# Patient Record
Sex: FEMALE | URBAN - METROPOLITAN AREA
[De-identification: names, ages, dates, MRNs, and addresses within clinical notes are randomized per-mention and may not be internally consistent; named-entity substitution may affect disease eponyms.]

---

## 2017-07-26 ENCOUNTER — INPATIENT (INPATIENT)
Facility: HOSPITAL | Age: 82
LOS: 2 days | Discharge: HOME | DRG: 512 | End: 2017-07-29
Attending: ORTHOPAEDIC SURGERY | Admitting: ORTHOPAEDIC SURGERY
Payer: MEDICARE

## 2017-07-26 VITALS
HEART RATE: 67 BPM | OXYGEN SATURATION: 100 % | DIASTOLIC BLOOD PRESSURE: 63 MMHG | RESPIRATION RATE: 22 BRPM | SYSTOLIC BLOOD PRESSURE: 132 MMHG | TEMPERATURE: 98 F

## 2017-07-26 DIAGNOSIS — Z98.890 OTHER SPECIFIED POSTPROCEDURAL STATES: Chronic | ICD-10-CM

## 2017-07-26 LAB
BASOPHILS # BLD AUTO: 0.1 K/UL — SIGNIFICANT CHANGE UP (ref 0–0.2)
BASOPHILS NFR BLD AUTO: 0.8 % — SIGNIFICANT CHANGE UP (ref 0–2)
EOSINOPHIL # BLD AUTO: 0.2 K/UL — SIGNIFICANT CHANGE UP (ref 0–0.5)
EOSINOPHIL NFR BLD AUTO: 2.5 % — SIGNIFICANT CHANGE UP (ref 0–6)
GAS PNL BLDV: SIGNIFICANT CHANGE UP
HCT VFR BLD CALC: 35.6 % — SIGNIFICANT CHANGE UP (ref 34.5–45)
HGB BLD-MCNC: 11.8 G/DL — SIGNIFICANT CHANGE UP (ref 11.5–15.5)
LYMPHOCYTES # BLD AUTO: 2.2 K/UL — SIGNIFICANT CHANGE UP (ref 1–3.3)
LYMPHOCYTES # BLD AUTO: 22.9 % — SIGNIFICANT CHANGE UP (ref 13–44)
MCHC RBC-ENTMCNC: 32.9 PG — SIGNIFICANT CHANGE UP (ref 27–34)
MCHC RBC-ENTMCNC: 33.1 GM/DL — SIGNIFICANT CHANGE UP (ref 32–36)
MCV RBC AUTO: 99.4 FL — SIGNIFICANT CHANGE UP (ref 80–100)
MONOCYTES # BLD AUTO: 1 K/UL — HIGH (ref 0–0.9)
MONOCYTES NFR BLD AUTO: 9.8 % — SIGNIFICANT CHANGE UP (ref 2–14)
NEUTROPHILS # BLD AUTO: 6.2 K/UL — SIGNIFICANT CHANGE UP (ref 1.8–7.4)
NEUTROPHILS NFR BLD AUTO: 63.9 % — SIGNIFICANT CHANGE UP (ref 43–77)
PLATELET # BLD AUTO: 259 K/UL — SIGNIFICANT CHANGE UP (ref 150–400)
RBC # BLD: 3.58 M/UL — LOW (ref 3.8–5.2)
RBC # FLD: 12.8 % — SIGNIFICANT CHANGE UP (ref 10.3–14.5)
WBC # BLD: 9.7 K/UL — SIGNIFICANT CHANGE UP (ref 3.8–10.5)
WBC # FLD AUTO: 9.7 K/UL — SIGNIFICANT CHANGE UP (ref 3.8–10.5)

## 2017-07-26 PROCEDURE — 93010 ELECTROCARDIOGRAM REPORT: CPT

## 2017-07-26 PROCEDURE — 99285 EMERGENCY DEPT VISIT HI MDM: CPT | Mod: 25,GC

## 2017-07-26 RX ORDER — TETANUS TOXOID, REDUCED DIPHTHERIA TOXOID AND ACELLULAR PERTUSSIS VACCINE, ADSORBED 5; 2.5; 8; 8; 2.5 [IU]/.5ML; [IU]/.5ML; UG/.5ML; UG/.5ML; UG/.5ML
0.5 SUSPENSION INTRAMUSCULAR ONCE
Qty: 0 | Refills: 0 | Status: COMPLETED | OUTPATIENT
Start: 2017-07-26 | End: 2017-07-26

## 2017-07-26 RX ORDER — CEFAZOLIN SODIUM 1 G
1000 VIAL (EA) INJECTION ONCE
Qty: 0 | Refills: 0 | Status: COMPLETED | OUTPATIENT
Start: 2017-07-26 | End: 2017-07-26

## 2017-07-26 RX ORDER — MORPHINE SULFATE 50 MG/1
4 CAPSULE, EXTENDED RELEASE ORAL ONCE
Qty: 0 | Refills: 0 | Status: DISCONTINUED | OUTPATIENT
Start: 2017-07-26 | End: 2017-07-26

## 2017-07-26 RX ADMIN — MORPHINE SULFATE 4 MILLIGRAM(S): 50 CAPSULE, EXTENDED RELEASE ORAL at 23:58

## 2017-07-26 RX ADMIN — Medication 100 MILLIGRAM(S): at 23:59

## 2017-07-26 RX ADMIN — TETANUS TOXOID, REDUCED DIPHTHERIA TOXOID AND ACELLULAR PERTUSSIS VACCINE, ADSORBED 0.5 MILLILITER(S): 5; 2.5; 8; 8; 2.5 SUSPENSION INTRAMUSCULAR at 23:59

## 2017-07-26 NOTE — ED PROVIDER NOTE - PROGRESS NOTE DETAILS
pain improved after morphine, was nauseas after xray now resolved, trop negative, ortho consulted for open fx -Slowey DO

## 2017-07-26 NOTE — ED PROVIDER NOTE - CARE PLAN
Principal Discharge DX:	Type I or II open displaced comminuted fracture of shaft of right radius, initial encounter  Secondary Diagnosis:	Type I or II open displaced comminuted fracture of shaft of right ulna, initial encounter

## 2017-07-26 NOTE — ED PROVIDER NOTE - ATTENDING CONTRIBUTION TO CARE
I was physically present for the E/M service provided. I agree with above history, physical, and plan which I have reviewed and edited where appropriate. I was physically present for the key portions of the service provided.    82yo F with CAD on ASA, CVA, HLD FFS c/o right wrist pain, right knee, right hip pain, CP  CN II-XII intact, I was physically present for the E/M service provided. I agree with above history, physical, and plan which I have reviewed and edited where appropriate. I was physically present for the key portions of the service provided.    84yo F with CAD on ASA, CVA, HLD FFS c/o right wrist pain, right knee, right hip pain, CP  CN II-XII intact, right wrist deformity w/ radial pulse +2 and distal sensation intact, no mid-line c-spine TTP, abdomen soft NTND.  -Wrist fx reduced by orthopedics

## 2017-07-26 NOTE — ED ADULT NURSE NOTE - OBJECTIVE STATEMENT
83 y.o F arrived to ED accompanied by family. A&Ox3. pt Dutch speaking, family at bedside to translate. As per pt family, pt lost balance and fell backwards, fell onto R side with impact to R arm; unsure if hit head, unknown LOC, pt takes baby ASA daily. pt c/o pain in R arm, especially R wrist; also c/o pain in R knee/ R hip. c/o chest discomfort, denies SOB, denies nausea/vomiting. upon exam, minor bleeding noted from R forearm; +peripheral pulses, cap refill <2seconds. Respirations nonlabored, O2 sat 100% RA; abdomen soft; moves all extremities. Denies fevers, chills, dizziness, vision changes, bowel/bladder symptoms. EKG performed, family at bedside.

## 2017-07-26 NOTE — ED PROVIDER NOTE - MEDICAL DECISION MAKING DETAILS
will ct head/neck, xray humerus, forearm, hip/pelvis, knee, tdap. will reexamine arm after pain meds, Abx if open. CP concerning with h/o ACS- trop and EKG. likely admission

## 2017-07-26 NOTE — ED PROVIDER NOTE - NS ED ROS FT
ROS: + CP no SOB. no cough. no n/v/d/c. no abd pain. no rash. no bleeding. no urinary complaints. no weakness. no vision changes. no HA. no neck/back pain. +extremity swelling.

## 2017-07-26 NOTE — ED PROVIDER NOTE - OBJECTIVE STATEMENT
84yo F with CAD on ASA, CVA, HLD shady went to say hi and scared her, she lost balance and fell backward. pain to rt arm especially wrist. pain to rt knee and hip. unable to walk. unsure if hit head. no HA. complaining of CP tightness, no SOB. started after fall. very anxious. bleeding unclear where- somewhere on arm. tdap unknown     PCP-

## 2017-07-26 NOTE — ED PROVIDER NOTE - PHYSICAL EXAMINATION
Gen: very comfortable, rt arm in sling, AOx3  Head: NCAT  HEENT: PERRL, oral mucosa moist, normal conjunctiva, neck supple  Lung: CTAB, no respiratory distress  CV: rrr, no murmur, Normal perfusion  Abd: soft, NTND  MSK: visibile deformity rt prxoimal wrist- may be open unable to fully examine due to pain, +ttp rt prox humerus unable to range rt arm, no scaphoid pain, no chest wall or pelvis pain, +ttp rt patella with rt knee effusion, +pain rt groin with ROM.   Neuro: No focal neurologic deficits  Skin: No rash   Psych: normal affect

## 2017-07-27 ENCOUNTER — TRANSCRIPTION ENCOUNTER (OUTPATIENT)
Age: 82
End: 2017-07-27

## 2017-07-27 DIAGNOSIS — J45.909 UNSPECIFIED ASTHMA, UNCOMPLICATED: ICD-10-CM

## 2017-07-27 DIAGNOSIS — E78.5 HYPERLIPIDEMIA, UNSPECIFIED: ICD-10-CM

## 2017-07-27 DIAGNOSIS — R52 PAIN, UNSPECIFIED: ICD-10-CM

## 2017-07-27 DIAGNOSIS — S52.351B: ICD-10-CM

## 2017-07-27 LAB
ALBUMIN SERPL ELPH-MCNC: 4.2 G/DL — SIGNIFICANT CHANGE UP (ref 3.3–5)
ALP SERPL-CCNC: 69 U/L — SIGNIFICANT CHANGE UP (ref 40–120)
ALT FLD-CCNC: 12 U/L RC — SIGNIFICANT CHANGE UP (ref 10–45)
ANION GAP SERPL CALC-SCNC: 14 MMOL/L — SIGNIFICANT CHANGE UP (ref 5–17)
APTT BLD: 33.4 SEC — SIGNIFICANT CHANGE UP (ref 27.5–37.4)
AST SERPL-CCNC: 19 U/L — SIGNIFICANT CHANGE UP (ref 10–40)
BILIRUB SERPL-MCNC: 0.2 MG/DL — SIGNIFICANT CHANGE UP (ref 0.2–1.2)
BLD GP AB SCN SERPL QL: NEGATIVE — SIGNIFICANT CHANGE UP
BUN SERPL-MCNC: 22 MG/DL — SIGNIFICANT CHANGE UP (ref 7–23)
CALCIUM SERPL-MCNC: 10.1 MG/DL — SIGNIFICANT CHANGE UP (ref 8.4–10.5)
CHLORIDE SERPL-SCNC: 101 MMOL/L — SIGNIFICANT CHANGE UP (ref 96–108)
CK MB BLD-MCNC: 2.3 % — SIGNIFICANT CHANGE UP (ref 0–3.5)
CK MB CFR SERPL CALC: 1.3 NG/ML — SIGNIFICANT CHANGE UP (ref 0–3.8)
CK SERPL-CCNC: 57 U/L — SIGNIFICANT CHANGE UP (ref 25–170)
CO2 SERPL-SCNC: 26 MMOL/L — SIGNIFICANT CHANGE UP (ref 22–31)
CREAT SERPL-MCNC: 1.13 MG/DL — SIGNIFICANT CHANGE UP (ref 0.5–1.3)
GLUCOSE SERPL-MCNC: 156 MG/DL — HIGH (ref 70–99)
INR BLD: 1.02 RATIO — SIGNIFICANT CHANGE UP (ref 0.88–1.16)
LIDOCAIN IGE QN: 34 U/L — SIGNIFICANT CHANGE UP (ref 7–60)
POTASSIUM SERPL-MCNC: 4.8 MMOL/L — SIGNIFICANT CHANGE UP (ref 3.5–5.3)
POTASSIUM SERPL-SCNC: 4.8 MMOL/L — SIGNIFICANT CHANGE UP (ref 3.5–5.3)
PROT SERPL-MCNC: 8.1 G/DL — SIGNIFICANT CHANGE UP (ref 6–8.3)
PROTHROM AB SERPL-ACNC: 11.1 SEC — SIGNIFICANT CHANGE UP (ref 9.8–12.7)
RH IG SCN BLD-IMP: POSITIVE — SIGNIFICANT CHANGE UP
RH IG SCN BLD-IMP: POSITIVE — SIGNIFICANT CHANGE UP
SODIUM SERPL-SCNC: 141 MMOL/L — SIGNIFICANT CHANGE UP (ref 135–145)
TROPONIN T SERPL-MCNC: <0.01 NG/ML — SIGNIFICANT CHANGE UP (ref 0–0.06)

## 2017-07-27 PROCEDURE — 73090 X-RAY EXAM OF FOREARM: CPT | Mod: 26,LT

## 2017-07-27 PROCEDURE — 73502 X-RAY EXAM HIP UNI 2-3 VIEWS: CPT | Mod: 26,RT

## 2017-07-27 PROCEDURE — 71010: CPT | Mod: 26

## 2017-07-27 PROCEDURE — 73030 X-RAY EXAM OF SHOULDER: CPT | Mod: 26,RT

## 2017-07-27 PROCEDURE — 73562 X-RAY EXAM OF KNEE 3: CPT | Mod: 26,RT

## 2017-07-27 PROCEDURE — 73060 X-RAY EXAM OF HUMERUS: CPT | Mod: 26,RT

## 2017-07-27 PROCEDURE — 72125 CT NECK SPINE W/O DYE: CPT | Mod: 26

## 2017-07-27 PROCEDURE — 73090 X-RAY EXAM OF FOREARM: CPT | Mod: 26,77,LT

## 2017-07-27 PROCEDURE — 70450 CT HEAD/BRAIN W/O DYE: CPT | Mod: 26

## 2017-07-27 RX ORDER — MAGNESIUM HYDROXIDE 400 MG/1
30 TABLET, CHEWABLE ORAL DAILY
Qty: 0 | Refills: 0 | Status: DISCONTINUED | OUTPATIENT
Start: 2017-07-27 | End: 2017-07-29

## 2017-07-27 RX ORDER — OXYCODONE HYDROCHLORIDE 5 MG/1
10 TABLET ORAL EVERY 4 HOURS
Qty: 0 | Refills: 0 | Status: DISCONTINUED | OUTPATIENT
Start: 2017-07-27 | End: 2017-07-27

## 2017-07-27 RX ORDER — DOCUSATE SODIUM 100 MG
100 CAPSULE ORAL THREE TIMES A DAY
Qty: 0 | Refills: 0 | Status: DISCONTINUED | OUTPATIENT
Start: 2017-07-27 | End: 2017-07-29

## 2017-07-27 RX ORDER — HEPARIN SODIUM 5000 [USP'U]/ML
5000 INJECTION INTRAVENOUS; SUBCUTANEOUS EVERY 8 HOURS
Qty: 0 | Refills: 0 | Status: DISCONTINUED | OUTPATIENT
Start: 2017-07-27 | End: 2017-07-29

## 2017-07-27 RX ORDER — OXYCODONE HYDROCHLORIDE 5 MG/1
5 TABLET ORAL EVERY 4 HOURS
Qty: 0 | Refills: 0 | Status: DISCONTINUED | OUTPATIENT
Start: 2017-07-27 | End: 2017-07-27

## 2017-07-27 RX ORDER — ASPIRIN/CALCIUM CARB/MAGNESIUM 324 MG
81 TABLET ORAL DAILY
Qty: 0 | Refills: 0 | Status: DISCONTINUED | OUTPATIENT
Start: 2017-07-27 | End: 2017-07-27

## 2017-07-27 RX ORDER — ONDANSETRON 8 MG/1
4 TABLET, FILM COATED ORAL EVERY 6 HOURS
Qty: 0 | Refills: 0 | Status: DISCONTINUED | OUTPATIENT
Start: 2017-07-27 | End: 2017-07-29

## 2017-07-27 RX ORDER — LEVOTHYROXINE SODIUM 125 MCG
1 TABLET ORAL
Qty: 0 | Refills: 0 | COMMUNITY

## 2017-07-27 RX ORDER — OXYCODONE HYDROCHLORIDE 5 MG/1
10 TABLET ORAL EVERY 4 HOURS
Qty: 0 | Refills: 0 | Status: DISCONTINUED | OUTPATIENT
Start: 2017-07-27 | End: 2017-07-29

## 2017-07-27 RX ORDER — ONDANSETRON 8 MG/1
4 TABLET, FILM COATED ORAL ONCE
Qty: 0 | Refills: 0 | Status: DISCONTINUED | OUTPATIENT
Start: 2017-07-27 | End: 2017-07-27

## 2017-07-27 RX ORDER — ACETAMINOPHEN 500 MG
650 TABLET ORAL EVERY 6 HOURS
Qty: 0 | Refills: 0 | Status: DISCONTINUED | OUTPATIENT
Start: 2017-07-27 | End: 2017-07-29

## 2017-07-27 RX ORDER — ALBUTEROL 90 UG/1
2 AEROSOL, METERED ORAL
Qty: 0 | Refills: 0 | COMMUNITY

## 2017-07-27 RX ORDER — CEFAZOLIN SODIUM 1 G
2000 VIAL (EA) INJECTION EVERY 8 HOURS
Qty: 0 | Refills: 0 | Status: COMPLETED | OUTPATIENT
Start: 2017-07-27 | End: 2017-07-28

## 2017-07-27 RX ORDER — HYDROMORPHONE HYDROCHLORIDE 2 MG/ML
0.5 INJECTION INTRAMUSCULAR; INTRAVENOUS; SUBCUTANEOUS ONCE
Qty: 0 | Refills: 0 | Status: DISCONTINUED | OUTPATIENT
Start: 2017-07-27 | End: 2017-07-27

## 2017-07-27 RX ORDER — OXYCODONE HYDROCHLORIDE 5 MG/1
5 TABLET ORAL EVERY 4 HOURS
Qty: 0 | Refills: 0 | Status: DISCONTINUED | OUTPATIENT
Start: 2017-07-27 | End: 2017-07-29

## 2017-07-27 RX ORDER — ALBUTEROL 90 UG/1
2 AEROSOL, METERED ORAL EVERY 6 HOURS
Qty: 0 | Refills: 0 | Status: DISCONTINUED | OUTPATIENT
Start: 2017-07-27 | End: 2017-07-27

## 2017-07-27 RX ORDER — ACETAMINOPHEN 500 MG
1000 TABLET ORAL ONCE
Qty: 0 | Refills: 0 | Status: COMPLETED | OUTPATIENT
Start: 2017-07-27 | End: 2017-07-27

## 2017-07-27 RX ORDER — LEVOTHYROXINE SODIUM 125 MCG
150 TABLET ORAL DAILY
Qty: 0 | Refills: 0 | Status: DISCONTINUED | OUTPATIENT
Start: 2017-07-27 | End: 2017-07-27

## 2017-07-27 RX ORDER — METOCLOPRAMIDE HCL 10 MG
10 TABLET ORAL EVERY 6 HOURS
Qty: 0 | Refills: 0 | Status: DISCONTINUED | OUTPATIENT
Start: 2017-07-27 | End: 2017-07-27

## 2017-07-27 RX ORDER — SODIUM CHLORIDE 9 MG/ML
1000 INJECTION, SOLUTION INTRAVENOUS
Qty: 0 | Refills: 0 | Status: DISCONTINUED | OUTPATIENT
Start: 2017-07-27 | End: 2017-07-29

## 2017-07-27 RX ORDER — HYDROMORPHONE HYDROCHLORIDE 2 MG/ML
0.5 INJECTION INTRAMUSCULAR; INTRAVENOUS; SUBCUTANEOUS
Qty: 0 | Refills: 0 | Status: DISCONTINUED | OUTPATIENT
Start: 2017-07-27 | End: 2017-07-27

## 2017-07-27 RX ORDER — ASPIRIN/CALCIUM CARB/MAGNESIUM 324 MG
1 TABLET ORAL
Qty: 0 | Refills: 0 | COMMUNITY

## 2017-07-27 RX ORDER — LEVOTHYROXINE SODIUM 125 MCG
150 TABLET ORAL DAILY
Qty: 0 | Refills: 0 | Status: DISCONTINUED | OUTPATIENT
Start: 2017-07-27 | End: 2017-07-29

## 2017-07-27 RX ORDER — ASPIRIN/CALCIUM CARB/MAGNESIUM 324 MG
81 TABLET ORAL DAILY
Qty: 0 | Refills: 0 | Status: DISCONTINUED | OUTPATIENT
Start: 2017-07-27 | End: 2017-07-29

## 2017-07-27 RX ORDER — SODIUM CHLORIDE 9 MG/ML
1000 INJECTION, SOLUTION INTRAVENOUS
Qty: 0 | Refills: 0 | Status: DISCONTINUED | OUTPATIENT
Start: 2017-07-27 | End: 2017-07-27

## 2017-07-27 RX ORDER — MORPHINE SULFATE 50 MG/1
2 CAPSULE, EXTENDED RELEASE ORAL EVERY 4 HOURS
Qty: 0 | Refills: 0 | Status: DISCONTINUED | OUTPATIENT
Start: 2017-07-27 | End: 2017-07-29

## 2017-07-27 RX ADMIN — SODIUM CHLORIDE 60 MILLILITER(S): 9 INJECTION, SOLUTION INTRAVENOUS at 15:30

## 2017-07-27 RX ADMIN — Medication 100 MILLIGRAM(S): at 21:48

## 2017-07-27 RX ADMIN — Medication 10 MILLIGRAM(S): at 05:20

## 2017-07-27 RX ADMIN — SODIUM CHLORIDE 75 MILLILITER(S): 9 INJECTION, SOLUTION INTRAVENOUS at 05:20

## 2017-07-27 RX ADMIN — MORPHINE SULFATE 4 MILLIGRAM(S): 50 CAPSULE, EXTENDED RELEASE ORAL at 02:33

## 2017-07-27 RX ADMIN — Medication 1000 MILLIGRAM(S): at 16:00

## 2017-07-27 RX ADMIN — Medication 150 MICROGRAM(S): at 07:32

## 2017-07-27 RX ADMIN — HYDROMORPHONE HYDROCHLORIDE 0.5 MILLIGRAM(S): 2 INJECTION INTRAMUSCULAR; INTRAVENOUS; SUBCUTANEOUS at 02:34

## 2017-07-27 RX ADMIN — Medication 400 MILLIGRAM(S): at 15:30

## 2017-07-27 NOTE — CONSULT NOTE ADULT - SUBJECTIVE AND OBJECTIVE BOX
84yo F with CAD on ASA, CVA, HLD shady went to say hi and scared her, she lost balance and fell backward. pain to rt arm especially wrist. pain to rt knee and hip. unable to walk. unsure if hit head. no HA. complaining of CP tightness, no SOB. started after fall. very anxious. bleeding unclear where- somewhere on arm. tdap unknown     PAST MEDICAL & SURGICAL HISTORY:  Osteoarthritis  CVA (Cerebral Infarction)  Hyperlipidemia  Asthma  H/O hand surgery  H/O left knee surgery        MEDICATIONS  (STANDING):  lactated ringers. 1000 milliLiter(s) (75 mL/Hr) IV Continuous <Continuous>  aspirin enteric coated 81 milliGRAM(s) Oral daily  levothyroxine 150 MICROGram(s) Oral daily    MEDICATIONS  (PRN):  oxyCODONE    IR 10 milliGRAM(s) Oral every 4 hours PRN Moderate Pain  oxyCODONE    IR 5 milliGRAM(s) Oral every 4 hours PRN Mild Pain  metoclopramide Injectable 10 milliGRAM(s) IV Push every 6 hours PRN Nausea and/or Vomiting  ALBUTerol    90 MICROgram(s) HFA Inhaler 2 Puff(s) Inhalation every 6 hours PRN Shortness of Breath and/or Wheezing    Social Hx:  Tobacco: negative  ETOH: Negative  Drugs: Negative    Family Hx:  Non contributory      ROS:  CONSTITUTIONAL: No weakness, fevers or chills  EYES/ENT: No visual changes;  No vertigo or throat pain   NECK: No pain or stiffness  RESPIRATORY: No cough, wheezing, hemoptysis; No shortness of breath  CARDIOVASCULAR: No chest pain or palpitations  GASTROINTESTINAL: No abdominal or epigastric pain. No nausea, vomiting, or hematemesis; No diarrhea or constipation. No melena or hematochezia.  GENITOURINARY: No dysuria, frequency or hematuria  NEUROLOGICAL: No numbness or weakness  SKIN: No itching, burning, rashes, or lesions   MUSCULOSKELETAL: Right arm pain    INTERVAL HPI/OVERNIGHT EVENTS:  T(C): 36.6 (07-27-17 @ 04:54), Max: 36.8 (07-27-17 @ 02:33)  HR: 83 (07-27-17 @ 04:54) (67 - 85)  BP: 148/71 (07-27-17 @ 04:54) (132/63 - 149/61)  RR: 16 (07-27-17 @ 04:54) (16 - 22)  SpO2: 95% (07-27-17 @ 04:54) (95% - 100%)  Wt(kg): --  I&O's Summary      PHYSICAL EXAM:  GENERAL: NAD, well-groomed, well-developed  HEAD:  Atraumatic, Normocephalic  EYES: EOMI, PERRLA, conjunctiva and sclera clear  ENMT: No tonsillar erythema, exudates, or enlargement; Moist mucous membranes, Good dentition, No lesions  NECK: Supple, No JVD, Normal thyroid  NERVOUS SYSTEM:  Alert & Oriented X3, Good concentration; Motor Strength 5/5 B/L upper and lower extremities; DTRs 2+ intact and symmetric  CHEST/LUNG: Clear to percussion bilaterally; No rales, rhonchi, wheezing, or rubs  HEART: Regular rate and rhythm;2/6 RICHARD  ABDOMEN: Soft, Nontender, Nondistended; Bowel sounds present  EXTREMITIES:  2+ Peripheral Pulses, No clubbing, cyanosis, or edema right arm in a cast  LYMPH: No lymphadenopathy noted  SKIN: No rashes or lesions        LABS:                        11.8   9.7   )-----------( 259      ( 26 Jul 2017 23:48 )             35.6     07-26    141  |  101  |  22  ----------------------------<  156<H>  4.8   |  26  |  1.13    Ca    10.1      26 Jul 2017 23:48    TPro  8.1  /  Alb  4.2  /  TBili  0.2  /  DBili  x   /  AST  19  /  ALT  12  /  AlkPhos  69  07-26    PT/INR - ( 26 Jul 2017 23:48 )   PT: 11.1 sec;   INR: 1.02 ratio         PTT - ( 26 Jul 2017 23:48 )  PTT:33.4 sec    CAPILLARY BLOOD GLUCOSE    EKG:    Ventricular Rate 71 BPM    Atrial Rate 71 BPM    P-R Interval 144 ms    QRS Duration 70 ms     ms    QTc 417 ms    P Axis 74 degrees    R Axis -17 degrees    T Axis 52 degrees    Diagnosis Line NORMAL SINUS RHYTHM  NORMAL ECG              Radiology reports:

## 2017-07-27 NOTE — H&P ADULT - ASSESSMENT
83F with right grade 1 open forearm both bone fracture    -antibiotics and tetanus given in ER  -NPO except meds  -IV fluids  -No anticoagulation  -OR today for I&D and ORIF  -Medical clearance pending  -NWB RUE in splint

## 2017-07-27 NOTE — H&P ADULT - NSHPPHYSICALEXAM_GEN_ALL_CORE
General: Not in acute distress    Right Upper Extremity:   Small poke hole on the radial aspect of distal forearm  Anterior interosseous nerve, Posterior interosseous nerve, Median, Ulnar, Radial, Musculocutaneous, and Axillary nerves intact  Sensation intact to light touch in C5-T1 nerve distribution  Radial pulse 2+  Compartments soft and compressible    Secondary survey: no tenderness to palpation of the left upper extremity, or bilateral lower extremities. No TTP of axial spine. Sensation/pulses intact.

## 2017-07-27 NOTE — H&P ADULT - HISTORY OF PRESENT ILLNESS
83 year old female presents with right grade 1 open forearm both bone fracture after a mechanical fall. Denies head strike or loss of consciousness. Denies any other orthopedic injuries at this time. Denies any paresthesia in the right upper extremity. She is right hand dominant.

## 2017-07-27 NOTE — PROGRESS NOTE ADULT - ASSESSMENT
84 y/o fm with right BB forearm open fracture for OR today, NPO  Zarina Arenas PA-C  Orthopaedic Surgery  Team pager [phone]  [facility] [phone]  korzug-[phone]

## 2017-07-27 NOTE — PROGRESS NOTE ADULT - SUBJECTIVE AND OBJECTIVE BOX
Patient is a 83y old  Female who presents with a chief complaint of right grade 1 open forearm both bone fracture (27 Jul 2017 03:29)  Patient for OR today  Patient resting without complaints.  No chest pain, SOB, N/V.    T(C): 36.6 (07-27-17 @ 04:54), Max: 36.8 (07-27-17 @ 02:33)  HR: 83 (07-27-17 @ 04:54) (67 - 85)  BP: 148/71 (07-27-17 @ 04:54) (132/63 - 149/61)  RR: 16 (07-27-17 @ 04:54) (16 - 22)  SpO2: 95% (07-27-17 @ 04:54) (95% - 100%)      Exam:  Alert and Oriented, No Acute Distress  Cards: +S1/S2, RRR  Pulm: CTAB  Lower Extremities:  right U/E, moving digits  SILT  +DP Pulse    Xray:                           11.8   9.7   )-----------( 259      ( 26 Jul 2017 23:48 )             35.6    07-26    141  |  101  |  22  ----------------------------<  156<H>  4.8   |  26  |  1.13    Ca    10.1      26 Jul 2017 23:48    TPro  8.1  /  Alb  4.2  /  TBili  0.2  /  DBili  x   /  AST  19  /  ALT  12  /  AlkPhos  69  07-26

## 2017-07-27 NOTE — CHART NOTE - NSCHARTNOTEFT_GEN_A_CORE
Resting, has headache but no Chest Pain, SOB, N/V.    T(C): 36.5 (07-27-17 @ 13:50), Max: 36.9 (07-27-17 @ 05:23)  HR: 71 (07-27-17 @ 14:30) (67 - 95)  BP: 164/70 (07-27-17 @ 14:30) (115/68 - 184/75)  RR: 16 (07-27-17 @ 14:30) (16 - 22)  SpO2: 100% (07-27-17 @ 14:30) (95% - 100%)  Wt(kg): --    Exam:  Alert and [place], No Acute Distress  Card: +S1/S2, RRR  Pulm: CTAB  RUE in sling, soft/compressible compartments.  Digits pink, warm, mobile with brisk cap refill.  SILT. S/p R interscalene block  Calves soft, non-tender bilaterally  R anterior knee pain     AM Labs    A/P: 83yF s/p ORIF R BB forearm fx.  Post op headache, NAD.  R interscalene block.  -PT/OT-NWB RUE/sling  -Ofirmev x1  -IS  -DVT PPx  -Pain Control  -Continue Current Tx  -AM labs    Brant Andrews PA-C  Team Pager #[phone]

## 2017-07-28 ENCOUNTER — TRANSCRIPTION ENCOUNTER (OUTPATIENT)
Age: 82
End: 2017-07-28

## 2017-07-28 LAB
ANION GAP SERPL CALC-SCNC: 13 MMOL/L — SIGNIFICANT CHANGE UP (ref 5–17)
BASOPHILS # BLD AUTO: 0.01 K/UL — SIGNIFICANT CHANGE UP (ref 0–0.2)
BASOPHILS NFR BLD AUTO: 0.1 % — SIGNIFICANT CHANGE UP (ref 0–2)
BUN SERPL-MCNC: 14 MG/DL — SIGNIFICANT CHANGE UP (ref 7–23)
CALCIUM SERPL-MCNC: 9 MG/DL — SIGNIFICANT CHANGE UP (ref 8.4–10.5)
CHLORIDE SERPL-SCNC: 103 MMOL/L — SIGNIFICANT CHANGE UP (ref 96–108)
CO2 SERPL-SCNC: 26 MMOL/L — SIGNIFICANT CHANGE UP (ref 22–31)
CREAT SERPL-MCNC: 0.94 MG/DL — SIGNIFICANT CHANGE UP (ref 0.5–1.3)
EOSINOPHIL # BLD AUTO: 0.01 K/UL — SIGNIFICANT CHANGE UP (ref 0–0.5)
EOSINOPHIL NFR BLD AUTO: 0.1 % — SIGNIFICANT CHANGE UP (ref 0–6)
GLUCOSE SERPL-MCNC: 106 MG/DL — HIGH (ref 70–99)
HCT VFR BLD CALC: 31.1 % — LOW (ref 34.5–45)
HGB BLD-MCNC: 9.9 G/DL — LOW (ref 11.5–15.5)
IMM GRANULOCYTES NFR BLD AUTO: 0.2 % — SIGNIFICANT CHANGE UP (ref 0–1.5)
LYMPHOCYTES # BLD AUTO: 1.93 K/UL — SIGNIFICANT CHANGE UP (ref 1–3.3)
LYMPHOCYTES # BLD AUTO: 21 % — SIGNIFICANT CHANGE UP (ref 13–44)
MCHC RBC-ENTMCNC: 30.7 PG — SIGNIFICANT CHANGE UP (ref 27–34)
MCHC RBC-ENTMCNC: 31.8 GM/DL — LOW (ref 32–36)
MCV RBC AUTO: 96.6 FL — SIGNIFICANT CHANGE UP (ref 80–100)
MONOCYTES # BLD AUTO: 0.99 K/UL — HIGH (ref 0–0.9)
MONOCYTES NFR BLD AUTO: 10.8 % — SIGNIFICANT CHANGE UP (ref 2–14)
NEUTROPHILS # BLD AUTO: 6.24 K/UL — SIGNIFICANT CHANGE UP (ref 1.8–7.4)
NEUTROPHILS NFR BLD AUTO: 67.8 % — SIGNIFICANT CHANGE UP (ref 43–77)
PLATELET # BLD AUTO: 247 K/UL — SIGNIFICANT CHANGE UP (ref 150–400)
POTASSIUM SERPL-MCNC: 4.9 MMOL/L — SIGNIFICANT CHANGE UP (ref 3.5–5.3)
POTASSIUM SERPL-SCNC: 4.9 MMOL/L — SIGNIFICANT CHANGE UP (ref 3.5–5.3)
RBC # BLD: 3.22 M/UL — LOW (ref 3.8–5.2)
RBC # FLD: 14.5 % — SIGNIFICANT CHANGE UP (ref 10.3–14.5)
SODIUM SERPL-SCNC: 142 MMOL/L — SIGNIFICANT CHANGE UP (ref 135–145)
WBC # BLD: 9.2 K/UL — SIGNIFICANT CHANGE UP (ref 3.8–10.5)
WBC # FLD AUTO: 9.2 K/UL — SIGNIFICANT CHANGE UP (ref 3.8–10.5)

## 2017-07-28 RX ORDER — ACETAMINOPHEN 500 MG
2 TABLET ORAL
Qty: 0 | Refills: 0 | COMMUNITY
Start: 2017-07-28

## 2017-07-28 RX ORDER — OXYCODONE HYDROCHLORIDE 5 MG/1
1 TABLET ORAL
Qty: 42 | Refills: 0 | OUTPATIENT
Start: 2017-07-28 | End: 2017-08-04

## 2017-07-28 RX ADMIN — Medication 81 MILLIGRAM(S): at 06:18

## 2017-07-28 RX ADMIN — HEPARIN SODIUM 5000 UNIT(S): 5000 INJECTION INTRAVENOUS; SUBCUTANEOUS at 06:17

## 2017-07-28 RX ADMIN — Medication 650 MILLIGRAM(S): at 07:48

## 2017-07-28 RX ADMIN — OXYCODONE HYDROCHLORIDE 5 MILLIGRAM(S): 5 TABLET ORAL at 22:28

## 2017-07-28 RX ADMIN — HEPARIN SODIUM 5000 UNIT(S): 5000 INJECTION INTRAVENOUS; SUBCUTANEOUS at 22:28

## 2017-07-28 RX ADMIN — Medication 100 MILLIGRAM(S): at 13:09

## 2017-07-28 RX ADMIN — Medication 100 MILLIGRAM(S): at 06:17

## 2017-07-28 RX ADMIN — HEPARIN SODIUM 5000 UNIT(S): 5000 INJECTION INTRAVENOUS; SUBCUTANEOUS at 13:09

## 2017-07-28 RX ADMIN — Medication 100 MILLIGRAM(S): at 22:28

## 2017-07-28 RX ADMIN — Medication 650 MILLIGRAM(S): at 08:18

## 2017-07-28 RX ADMIN — OXYCODONE HYDROCHLORIDE 5 MILLIGRAM(S): 5 TABLET ORAL at 23:00

## 2017-07-28 RX ADMIN — Medication 100 MILLIGRAM(S): at 11:40

## 2017-07-28 RX ADMIN — Medication 100 MILLIGRAM(S): at 06:16

## 2017-07-28 RX ADMIN — Medication 150 MICROGRAM(S): at 06:17

## 2017-07-28 NOTE — DISCHARGE NOTE ADULT - NS AS ACTIVITY OBS
Walking-Outdoors allowed/Do not make important decisions/No Heavy lifting/straining/Walking-Indoors allowed/maintain non-weight bearing right upper extremity/Stairs allowed/Do not drive or operate machinery

## 2017-07-28 NOTE — DISCHARGE NOTE ADULT - ADDITIONAL INSTRUCTIONS
Keep surgical incision/dressing clean and dry, maintain non-weight bearing right upper extremity. Follow up with Dr Santiago in his office in 11-13 days for wound check and suture removal Keep surgical incision/dressing clean and dry, maintain non-weight bearing right upper extremity.   Follow up with Dr Santiago in his office in 10-12 days for wound check and suture removal  Follow up with your private internist / PMD in 4-6 weeks re: general checkup (and possible medication adjustment)

## 2017-07-28 NOTE — OCCUPATIONAL THERAPY INITIAL EVALUATION ADULT - PLANNED THERAPY INTERVENTIONS, OT EVAL
ROM/transfer training/bed mobility training/ADL retraining/IADL retraining/strengthening/balance training

## 2017-07-28 NOTE — OCCUPATIONAL THERAPY INITIAL EVALUATION ADULT - ADDITIONAL COMMENTS
xray forearm 7/27: S/p closed reduction of comminuted both bone forearm fx. xray chest 7/27: negative. Xray shoulder: negative for fx and dislocations, superior subluxation of humerus consisten w/ rotator cuff arthopathy. Xray Pelvis: negative for fx, degenerative changes of lumbar spine. Xray knee: negative for fx and dislocation, moderate degenerative arthrosis. EKG: normal

## 2017-07-28 NOTE — PHYSICAL THERAPY INITIAL EVALUATION ADULT - RANGE OF MOTION EXAMINATION, REHAB EVAL
bilateral lower extremity ROM was WFL (within functional limits)/Left UE ROM was WFL (within functional limits)

## 2017-07-28 NOTE — DISCHARGE NOTE ADULT - PATIENT PORTAL LINK FT
“You can access the Follow[facility] Patient Portal, offered by [facility], by registering with the following website: [email or web address]”

## 2017-07-28 NOTE — DISCHARGE NOTE ADULT - HOSPITAL COURSE
Chief Complaint/Reason for Admission: right grade 1 open forearm both bone fracture	  History of Present Illness: 	  83 year old female presents with right grade 1 open forearm both bone fracture after a mechanical fall. Denies head strike or loss of consciousness. Denies any other orthopedic injuries at this time. Denies any paresthesia in the right upper extremity. She is right hand dominant.     Review of Systems:  Other Review of Systems: All other review of systems negative, except as noted in HPI	      Allergies and Intolerances:        Allergies:  	sulfa drugs: Drug Category, Unknown    Home Medications:   * Patient Currently Takes Medications as of 27-Jul-2017 03:32 documented in Prescription Writer  · 	Synthroid 150 mcg (0.15 mg) oral tablet: 1 tab(s) orally once a day, Last Dose Taken:    · 	albuterol 90 mcg/inh inhalation aerosol: 2 puff(s) inhaled 4 times a day, Last Dose Taken:    · 	Ecotrin Adult Low Strength 81 mg oral delayed release tablet: 1 tab(s) orally once a day, Last Dose Taken:      . .    Patient History:   Past Medical History:  Asthma    CVA (Cerebral Infarction)    Hyperlipidemia    Osteoarthritis.    Past Surgical History:  H/O hand surgery    H/O left knee surgery.    Hospital Course:  84 y/o fm underwent I&D, ORIF right radius on 7/27/17 with Dr.J. Santiago.  Patient tolerated procedure well.  Patient was evaluated postoperatively by physical  and occupational therapists for non-weight bearing right upper extremity and cleared patient for discharge home.  Patient advised to keep surgical incision/dressing clean and dry, and follow up with Dr. Santiago  post op day #14.(8/10/17).

## 2017-07-28 NOTE — DISCHARGE NOTE ADULT - MEDICATION SUMMARY - MEDICATIONS TO TAKE
I will START or STAY ON the medications listed below when I get home from the hospital:    Ecotrin Adult Low Strength 81 mg oral delayed release tablet  -- 1 tab(s) by mouth once a day  -- Indication: For Antiplatelet therapy    acetaminophen 325 mg oral tablet  -- 2 tab(s) by mouth every 6 hours, As needed, For Temp greater than 38 C (100.4 F)  -- Indication: For fever, H/A    acetaminophen 325 mg oral tablet  -- 2 tab(s) by mouth every 6 hours, As needed, Mild Pain (1 - 3)  -- Indication: For mild pain    oxyCODONE 5 mg oral tablet  -- 1 tab(s) by mouth every 4 hours, As needed, Moderate Pain (4 - 6) -for moderate pain MDD:6  -- Indication: For moderate pain    albuterol 90 mcg/inh inhalation aerosol  -- 2 puff(s) inhaled 4 times a day  -- Indication: For respiratory agent    Synthroid 150 mcg (0.15 mg) oral tablet  -- 1 tab(s) by mouth once a day  -- Indication: For Hormone replacement

## 2017-07-28 NOTE — OCCUPATIONAL THERAPY INITIAL EVALUATION ADULT - PERTINENT HX OF CURRENT PROBLEM, REHAB EVAL
83 YOF p/w R grade 1 open forearm both bone fx s/p mechanical fall. Denies head strike or LOC. Denies any other orthopedic injuries at this time. Denies any paresthesia in the right upper extremity. She is right hand dominant.

## 2017-07-28 NOTE — PROGRESS NOTE ADULT - SUBJECTIVE AND OBJECTIVE BOX
Patient is a 83y old  Female who presents with a chief complaint of right grade 1 open forearm both bone fracture  Incision and Drainage - Open Reduction Internal Fixation / Surgical Repair R Wrist (28 Jul 2017 10:23)    The patient is seen postop right wrist now in cast after ORIF for an open fracture of Ulna and radius. Pre and post op antibiotics are completed. The patient feels well and has no pain, as explained via a Ukrainian speaking . She has no new complaints at this time.        MEDICATIONS  (STANDING):  heparin  Injectable 5000 Unit(s) SubCutaneous every 8 hours  docusate sodium 100 milliGRAM(s) Oral three times a day  aspirin enteric coated 81 milliGRAM(s) Oral daily  levothyroxine 150 MICROGram(s) Oral daily  lactated ringers. 1000 milliLiter(s) (60 mL/Hr) IV Continuous <Continuous>    MEDICATIONS  (PRN):  acetaminophen   Tablet 650 milliGRAM(s) Oral every 6 hours PRN For Temp greater than 38 C (100.4 F)  acetaminophen   Tablet. 650 milliGRAM(s) Oral every 6 hours PRN Mild Pain (1 - 3)  oxyCODONE    IR 10 milliGRAM(s) Oral every 4 hours PRN Severe Pain (7 - 10)  oxyCODONE    IR 5 milliGRAM(s) Oral every 4 hours PRN Moderate Pain (4 - 6)  morphine  - Injectable 2 milliGRAM(s) IV Push every 4 hours PRN severe breakthrough pain  aluminum hydroxide/magnesium hydroxide/simethicone Suspension 30 milliLiter(s) Oral four times a day PRN Indigestion  ondansetron Injectable 4 milliGRAM(s) IV Push every 6 hours PRN Nausea and/or Vomiting  magnesium hydroxide Suspension 30 milliLiter(s) Oral daily PRN Constipation  bisacodyl Suppository 10 milliGRAM(s) Rectal daily PRN If no bowel movement      Allergies    sulfa drugs (Unknown)    Intolerances      REVIEW OF SYSTEMS:  CONSTITUTIONAL: No fever, No change in weight, No fatigue  HEAD: No headache, No dizziness, No recent trauma  EYES: No eye pain, No visual disturbances, No discharge  ENT:  No difficulty hearing, No tinnitus, No vertigo, No sinus pain, No throat pain  NECK: No pain, No stiffness  BREASTS: No pain, No masses, No nipple discharge  RESPIRATORY: No cough, No wheezing, No chills, No hemoptysis, No shortness of breath at rest or exertional shortness of breath  CARDIOVASCULAR: No chest pain, No palpitations, No dizziness, No CHF, No arrhythmia, No cardiomegaly, No leg swelling  GASTROINTESTINAL: No abdominal, No epigastric pain. No nausea, No vomiting, No hematemesis, No diarrhea, No constipation. No melena, No hematochezia, No GERD  GENITOURINARY: No dysuria, No frequency, No hematuria, No incontinence, No nocturia, No hesitancy  SKIN: No itching, No burning, No rashes, No lesions   LYMPH NODES: No history of enlarged glands  ENDOCRINE: No heat or cold intolerance, No hair loss. No osteoporosis, No thyroid disease  MUSCULOSKELETAL: No joint pain or swelling, No muscle, back, or extremity pain. +Right arm with moderate postop pain.  PSYCHIATRIC: No depression, No anxiety, No mood swings, No difficulty sleeping  HEME/LYMPH: No easy bruising, No anticoagulants, No bleeding disorder, No bleeding gums  ALLERGY AND IMMUNOLOGIC: No hives, No eczema  NEUROLOGICAL: No memory loss, No loss of strength, No numbness, No tremors  VITALS:   T(C): 36.9 (07-29-17 @ 09:02), Max: 37.1 (07-28-17 @ 21:03)  HR: 73 (07-29-17 @ 09:02) (72 - 80)  BP: 157/71 (07-29-17 @ 09:02) (108/62 - 157/71)  RR: 17 (07-29-17 @ 09:02) (17 - 18)  SpO2: 96% (07-29-17 @ 09:02) (96% - 97%)  Wt(kg): --    PHYSICAL EXAM:  GENERAL: NAD, well nourished and conversant  HEAD:  Atraumatic  EYES: EOM, PERRLA, conjunctiva pink and sclera white  ENT: No tonsillar erythema, exudates, or enlargement, moist mucous membranes, good dentition, no lesions  NECK: Supple, No JVD, normal thyroid, carotids with normal upstrokes and no bruits  CHEST/LUNG: Clear to auscultation bilaterally, No rales, rhonchi, wheezing, or rubs  HEART: Regular rate and rhythm, No murmurs, rubs, or gallops  ABDOMEN: Soft, nondistended, no masses, guarding, tenderness or rebound, bowel sounds present  EXTREMITIES:  2+ Peripheral Pulses, No clubbing, cyanosis, or edema. No arthritis of shoulders, elbows, hands, hips, knees, ankles, or feet. No DJD C spine, T spine, or L/S spine. + right forearm immobilized with a cast and has +1 distal hand edema  LYMPH: No lymphadenopathy noted  SKIN: No rashes or lesions  NERVOUS SYSTEM:  Alert & Oriented X3, normal cognitive function. Motor Strength 5/5 right upper and right lower.  5/5 left upper and left lower extremities, DTRs 2+ intact and symmetric    LABS:        CBC Full  -  ( 28 Jul 2017 08:50 )  WBC Count : 9.20 K/uL  Hemoglobin : 9.9 g/dL  Hematocrit : 31.1 %  Platelet Count - Automated : 247 K/uL  Mean Cell Volume : 96.6 fl  Mean Cell Hemoglobin : 30.7 pg  Mean Cell Hemoglobin Concentration : 31.8 gm/dL  Auto Neutrophil # : 6.24 K/uL  Auto Lymphocyte # : 1.93 K/uL  Auto Monocyte # : 0.99 K/uL  Auto Eosinophil # : 0.01 K/uL  Auto Basophil # : 0.01 K/uL  Auto Neutrophil % : 67.8 %  Auto Lymphocyte % : 21.0 %  Auto Monocyte % : 10.8 %  Auto Eosinophil % : 0.1 %  Auto Basophil % : 0.1 %    07-29    141  |  103  |  15  ----------------------------<  84  4.3   |  26  |  0.87    Ca    8.9      29 Jul 2017 08:56            CAPILLARY BLOOD GLUCOSE          RADIOLOGY & ADDITIONAL TESTS:      Consultant(s):    Care Discussed with Consultants/Other Providers [ ] YES  [ ] NO

## 2017-07-28 NOTE — OCCUPATIONAL THERAPY INITIAL EVALUATION ADULT - LIVES WITH, PROFILE
pt states lives w/ daughter and granddaughter in basement apartment w/ 10-12 steps to enter, bathroom w/ walk in shower and kitchenette in basement.

## 2017-07-28 NOTE — DISCHARGE NOTE ADULT - CARE PLAN
Principal Discharge DX:	Type I or II open displaced comminuted fracture of shaft of right radius, initial encounter  Goal:	surgical intervention should result in improved function  Instructions for follow-up, activity and diet:	Keep surgical incision/dressing clean and dry, maintain non-weight bearing right upper extremity. Continue low cholesterol diet, follow up with Dr Santiago in days for wound check and suture removal Principal Discharge DX:	Type I or II open displaced comminuted fracture of shaft of right radius, initial encounter  Goal:	surgical intervention should result in improved function  Instructions for follow-up, activity and diet:	Keep surgical incision/dressing clean and dry,   maintain non-weight bearing right upper extremity.   Continue low cholesterol diet,  ice to affected incision every 4-6 hours x 72 hours   elevate affected extremity when @ rest    follow up with Dr Santiago in 10-12 days for wound check and suture removal

## 2017-07-28 NOTE — DISCHARGE NOTE ADULT - REASON FOR ADMISSION
right grade 1 open forearm both bone fracture/ I&D, ORIF right radius right grade 1 open forearm both bone fracture  Incision and Drainage - Open Reduction Internal Fixation / Surgical Repair R Wrist

## 2017-07-28 NOTE — PHYSICAL THERAPY INITIAL EVALUATION ADULT - ADDITIONAL COMMENTS
pt resides w/ daughter in basement apartment w/ 10-12 steps/rail.  Pt is a community ambulator.  Pt report daughter at home works throughout the day.

## 2017-07-28 NOTE — DISCHARGE NOTE ADULT - CARE PROVIDER_API CALL
Faisal Santiago), Orthopaedic Surgery  [address] [place] [id number]  Phone: [phone]  Fax: [phone]

## 2017-07-28 NOTE — PROGRESS NOTE ADULT - SUBJECTIVE AND OBJECTIVE BOX
Patient is a 83y old  Female who presents with a chief complaint of right grade 1 open forearm both bone fracture (27 Jul 2017 03:29)  Patient s/p I&D, ORIF right radius POD#1  Patient resting without complaints.  No chest pain, SOB, N/V.    T(C): 36.9 (07-28-17 @ 04:25), Max: 37 (07-27-17 @ 19:33)  HR: 76 (07-28-17 @ 04:25) (67 - 95)  BP: 138/66 (07-28-17 @ 04:25) (115/68 - 184/75)  RR: 18 (07-28-17 @ 04:25) (11 - 18)  SpO2: 95% (07-28-17 @ 04:25) (93% - 100%)      Exam:  Alert and Oriented, No Acute Distress  Cards: +S1/S2, RRR  Pulm: CTAB  Lower Extremities:  Calves Soft, Non-tender bilaterally  +PF/DF/EHL/FHL  Right U/E - moving all digits, sensation intact, brisk cap refill, in splint  SILT    Labs:                        11.8   9.7   )-----------( 259      ( 26 Jul 2017 23:48 )             35.6    07-26    141  |  101  |  22  ----------------------------<  156<H>  4.8   |  26  |  1.13    Ca    10.1      26 Jul 2017 23:48    TPro  8.1  /  Alb  4.2  /  TBili  0.2  /  DBili  x   /  AST  19  /  ALT  12  /  AlkPhos  69  07-26

## 2017-07-28 NOTE — PROGRESS NOTE ADULT - ASSESSMENT
84 y/o fm s/p I&D, ORIF right radius POD#1, physical/occupational therapy, discharge home today  Zarina Arenas PA-C  Orthopaedic Surgery  Team pager [phone]  [facility] [phone]  [phone]

## 2017-07-29 VITALS
SYSTOLIC BLOOD PRESSURE: 150 MMHG | TEMPERATURE: 98 F | HEART RATE: 82 BPM | RESPIRATION RATE: 18 BRPM | OXYGEN SATURATION: 95 % | DIASTOLIC BLOOD PRESSURE: 73 MMHG

## 2017-07-29 LAB
ANION GAP SERPL CALC-SCNC: 12 MMOL/L — SIGNIFICANT CHANGE UP (ref 5–17)
BUN SERPL-MCNC: 15 MG/DL — SIGNIFICANT CHANGE UP (ref 7–23)
CALCIUM SERPL-MCNC: 8.9 MG/DL — SIGNIFICANT CHANGE UP (ref 8.4–10.5)
CHLORIDE SERPL-SCNC: 103 MMOL/L — SIGNIFICANT CHANGE UP (ref 96–108)
CO2 SERPL-SCNC: 26 MMOL/L — SIGNIFICANT CHANGE UP (ref 22–31)
CREAT SERPL-MCNC: 0.87 MG/DL — SIGNIFICANT CHANGE UP (ref 0.5–1.3)
GLUCOSE SERPL-MCNC: 84 MG/DL — SIGNIFICANT CHANGE UP (ref 70–99)
POTASSIUM SERPL-MCNC: 4.3 MMOL/L — SIGNIFICANT CHANGE UP (ref 3.5–5.3)
POTASSIUM SERPL-SCNC: 4.3 MMOL/L — SIGNIFICANT CHANGE UP (ref 3.5–5.3)
SODIUM SERPL-SCNC: 141 MMOL/L — SIGNIFICANT CHANGE UP (ref 135–145)

## 2017-07-29 PROCEDURE — 85730 THROMBOPLASTIN TIME PARTIAL: CPT

## 2017-07-29 PROCEDURE — 80053 COMPREHEN METABOLIC PANEL: CPT

## 2017-07-29 PROCEDURE — 90471 IMMUNIZATION ADMIN: CPT

## 2017-07-29 PROCEDURE — 90715 TDAP VACCINE 7 YRS/> IM: CPT

## 2017-07-29 PROCEDURE — 84484 ASSAY OF TROPONIN QUANT: CPT

## 2017-07-29 PROCEDURE — 83690 ASSAY OF LIPASE: CPT

## 2017-07-29 PROCEDURE — 83605 ASSAY OF LACTIC ACID: CPT

## 2017-07-29 PROCEDURE — 84295 ASSAY OF SERUM SODIUM: CPT

## 2017-07-29 PROCEDURE — 82435 ASSAY OF BLOOD CHLORIDE: CPT

## 2017-07-29 PROCEDURE — 86901 BLOOD TYPING SEROLOGIC RH(D): CPT

## 2017-07-29 PROCEDURE — 82947 ASSAY GLUCOSE BLOOD QUANT: CPT

## 2017-07-29 PROCEDURE — 96375 TX/PRO/DX INJ NEW DRUG ADDON: CPT

## 2017-07-29 PROCEDURE — 72125 CT NECK SPINE W/O DYE: CPT

## 2017-07-29 PROCEDURE — 71045 X-RAY EXAM CHEST 1 VIEW: CPT

## 2017-07-29 PROCEDURE — 93005 ELECTROCARDIOGRAM TRACING: CPT

## 2017-07-29 PROCEDURE — 82565 ASSAY OF CREATININE: CPT

## 2017-07-29 PROCEDURE — 84132 ASSAY OF SERUM POTASSIUM: CPT

## 2017-07-29 PROCEDURE — 73562 X-RAY EXAM OF KNEE 3: CPT

## 2017-07-29 PROCEDURE — 73502 X-RAY EXAM HIP UNI 2-3 VIEWS: CPT

## 2017-07-29 PROCEDURE — 85027 COMPLETE CBC AUTOMATED: CPT

## 2017-07-29 PROCEDURE — 96374 THER/PROPH/DIAG INJ IV PUSH: CPT

## 2017-07-29 PROCEDURE — 85610 PROTHROMBIN TIME: CPT

## 2017-07-29 PROCEDURE — 86900 BLOOD TYPING SEROLOGIC ABO: CPT

## 2017-07-29 PROCEDURE — 85014 HEMATOCRIT: CPT

## 2017-07-29 PROCEDURE — 73060 X-RAY EXAM OF HUMERUS: CPT

## 2017-07-29 PROCEDURE — 82550 ASSAY OF CK (CPK): CPT

## 2017-07-29 PROCEDURE — 86850 RBC ANTIBODY SCREEN: CPT

## 2017-07-29 PROCEDURE — 97530 THERAPEUTIC ACTIVITIES: CPT

## 2017-07-29 PROCEDURE — 97116 GAIT TRAINING THERAPY: CPT

## 2017-07-29 PROCEDURE — 82803 BLOOD GASES ANY COMBINATION: CPT

## 2017-07-29 PROCEDURE — 82553 CREATINE MB FRACTION: CPT

## 2017-07-29 PROCEDURE — 76000 FLUOROSCOPY <1 HR PHYS/QHP: CPT

## 2017-07-29 PROCEDURE — 80048 BASIC METABOLIC PNL TOTAL CA: CPT

## 2017-07-29 PROCEDURE — 70450 CT HEAD/BRAIN W/O DYE: CPT

## 2017-07-29 PROCEDURE — 97162 PT EVAL MOD COMPLEX 30 MIN: CPT

## 2017-07-29 PROCEDURE — 99285 EMERGENCY DEPT VISIT HI MDM: CPT | Mod: 25

## 2017-07-29 PROCEDURE — 97165 OT EVAL LOW COMPLEX 30 MIN: CPT

## 2017-07-29 PROCEDURE — 73030 X-RAY EXAM OF SHOULDER: CPT

## 2017-07-29 PROCEDURE — 82330 ASSAY OF CALCIUM: CPT

## 2017-07-29 PROCEDURE — 73090 X-RAY EXAM OF FOREARM: CPT

## 2017-07-29 PROCEDURE — C1713: CPT

## 2017-07-29 RX ADMIN — Medication 81 MILLIGRAM(S): at 06:30

## 2017-07-29 RX ADMIN — HEPARIN SODIUM 5000 UNIT(S): 5000 INJECTION INTRAVENOUS; SUBCUTANEOUS at 06:30

## 2017-07-29 RX ADMIN — OXYCODONE HYDROCHLORIDE 5 MILLIGRAM(S): 5 TABLET ORAL at 07:03

## 2017-07-29 RX ADMIN — OXYCODONE HYDROCHLORIDE 5 MILLIGRAM(S): 5 TABLET ORAL at 06:33

## 2017-07-29 RX ADMIN — Medication 100 MILLIGRAM(S): at 06:30

## 2017-07-29 RX ADMIN — Medication 150 MICROGRAM(S): at 06:30

## 2017-07-29 NOTE — PROGRESS NOTE ADULT - SUBJECTIVE AND OBJECTIVE BOX
Post op Day [ 2]       Patient resting without complaints.  No chest pain, SOB, N/V.    T(C): 37 (07-29-17 @ 05:05), Max: 37.1 (07-28-17 @ 21:03)  HR: 78 (07-29-17 @ 05:05) (70 - 80)  BP: 121/71 (07-29-17 @ 05:05) (108/62 - 126/68)  RR: 18 (07-29-17 @ 05:05) (18 - 18)  SpO2: 96% (07-29-17 @ 05:05) (96% - 98%)      Exam:    EXT:     RUE      Dsg c/d/i       (+) swelling       moving ALL digits       No sensory deficits                                     9.9    9.20  )-----------( 247      ( 28 Jul 2017 08:50 )             31.1    07-28    142  |  103  |  14  ----------------------------<  106<H>  4.9   |  26  |  0.94    Ca    9.0      28 Jul 2017 08:32        --.

## 2019-10-25 NOTE — DISCHARGE NOTE ADULT - PROCEDURE 2
6 month integrative/thyroid follow up.  Labs done by JOSELYN Fraga, on 10/23/19.  She states she is good   Irrigation of open fracture of forearm

## 2020-09-16 NOTE — PRE-OP CHECKLIST - VERIFY SURGICAL SITE/SIDE WITH PATIENT
done Complex Repair And Bilobe Flap Text: The defect edges were debeveled with a #15 scalpel blade.  The primary defect was closed partially with a complex linear closure.  Given the location of the remaining defect, shape of the defect and the proximity to free margins a bilobe flap was deemed most appropriate for complete closure of the defect.  Using a sterile surgical marker, an appropriate advancement flap was drawn incorporating the defect and placing the expected incisions within the relaxed skin tension lines where possible.    The area thus outlined was incised deep to adipose tissue with a #15 scalpel blade.  The skin margins were undermined to an appropriate distance in all directions utilizing iris scissors.

## 2021-03-31 NOTE — ED PROVIDER NOTE - PROGRESS NOTE
Improved. decreased kelley/increased time in double stance/decreased velocity of limb motion/decreased weight-shifting ability

## 2021-10-06 NOTE — ED ADULT NURSE NOTE - BREATHING, MLM
Spontaneous, unlabored and symmetrical Post-Care Instructions: I reviewed with the patient in detail post-care instructions. Patient is not to engage in any heavy lifting, exercise, or swimming for the next 14 days. Should the patient develop any fevers, chills, bleeding, severe pain patient will contact the office immediately.

## 2022-03-07 NOTE — OCCUPATIONAL THERAPY INITIAL EVALUATION ADULT - LEVEL OF INDEPENDENCE: SIT/STAND, REHAB EVAL
Patient presents with high blood pressure and chest pain. Patient states that she was seen last week for the same complaints. Patient states that she saw her PCP who recommended she come in to be evaluated after she had a blood pressure of 245/80. Patient is on amlodipine and valsartan. Patient states that she does take her medication appropriately. She is describes chest pain as a mild pain that is sharp in the left side of her chest.  Nothing makes it better nor worse. Chart review shows that her work-up was negative at her last visit. Patient otherwise denies any other complaints including but not limited to vision changes, headache, shortness of breath, nausea, vomiting, or syncope. The history is provided by the patient. No  was used. Review of Systems   Constitutional: Negative for chills and fever. HENT: Negative for ear pain, sinus pressure and sore throat. Eyes: Negative for pain, discharge and redness. Respiratory: Negative for cough, shortness of breath and wheezing. Cardiovascular: Positive for chest pain. Gastrointestinal: Negative for abdominal distention, diarrhea, nausea and vomiting. Genitourinary: Negative for dysuria and frequency. Musculoskeletal: Negative for arthralgias and back pain. Skin: Negative for rash and wound. Neurological: Negative for weakness and headaches. Hematological: Negative for adenopathy. All other systems reviewed and are negative. Physical Exam  Vitals and nursing note reviewed. Constitutional:       General: She is not in acute distress. Appearance: She is well-developed. She is obese. HENT:      Head: Normocephalic and atraumatic. Eyes:      Extraocular Movements: Extraocular movements intact. Conjunctiva/sclera: Conjunctivae normal.      Pupils: Pupils are equal, round, and reactive to light. Cardiovascular:      Rate and Rhythm: Normal rate and regular rhythm.       Heart sounds: Normal heart sounds. No murmur heard. Pulmonary:      Effort: Pulmonary effort is normal. No respiratory distress. Breath sounds: Normal breath sounds. No wheezing or rales. Abdominal:      General: Bowel sounds are normal.      Palpations: Abdomen is soft. Tenderness: There is no abdominal tenderness. There is no guarding or rebound. Musculoskeletal:      Cervical back: Normal range of motion and neck supple. Skin:     General: Skin is warm and dry. Neurological:      Mental Status: She is alert and oriented to person, place, and time. Cranial Nerves: No cranial nerve deficit. Coordination: Coordination normal.          Procedures     MDM  Number of Diagnoses or Management Options  Hypertension, unspecified type  Diagnosis management comments: Patient presents with high blood pressure and chest pain. Patient has had this ongoing since last week and was previously worked up for it. Patient's blood pressure is elevated but does not meet criteria for hypertensive urgency. Troponin was negative. EKG did not show any obvious signs of ischemia. CXR was without acute cardiopulmonary processes. Labwork was unremarkable otherwise. Patient discharged home with instructions to follow with their PCP. Amount and/or Complexity of Data Reviewed  Clinical lab tests: reviewed  Tests in the radiology section of CPT®: reviewed  Tests in the medicine section of CPT®: reviewed  Decide to obtain previous medical records or to obtain history from someone other than the patient: yes         ED Course as of 03/08/22 1447   Mon Mar 07, 2022   1556 EKG shows normal sinus rhythm with a ventricular rate of 91 bpm.  Normal axis. There are no obvious ST elevations or T wave inversions present. Largely comparable to previous EKG on 3/1/2022.   As interpreted by myself the ED physician. [BB]      ED Course User Index  [BB] Kilo Rodríguez DO          ED Course as of 03/08/22 [address] Mar 07, 2022   1556 EKG shows normal sinus rhythm with a ventricular rate of 91 bpm.  Normal axis. There are no obvious ST elevations or T wave inversions present. Largely comparable to previous EKG on 3/1/2022. As interpreted by myself the ED physician. [BB]      ED Course User Index  [BB] Janice Collier DO       --------------------------------------------- PAST HISTORY ---------------------------------------------  Past Medical History:  has a past medical history of Diabetes mellitus ([facility] [address] Hyperlipidemia, Hypertension, Obesity, Pulmonary embolism ([facility] [address] and Type II or unspecified type diabetes mellitus without mention of complication, not stated as uncontrolled. Past Surgical History:  has a past surgical history that includes eye surgery (Bilateral, 05/2016) and Colonoscopy (02/07/2017). Social History:  reports that she has never smoked. She has never used smokeless tobacco. She reports that she does not drink alcohol and does not use drugs. Family History: family history is not on file. The patients home medications have been reviewed.     Allergies: Pcn [penicillins]    -------------------------------------------------- RESULTS -------------------------------------------------  Labs:  Results for orders placed or performed during the hospital encounter of 03/07/22   CBC with Auto Differential   Result Value Ref Range    WBC 13.8 (H) 4.5 - 11.5 E9/L    RBC 4.46 3.50 - 5.50 E12/L    Hemoglobin 12.7 11.5 - 15.5 g/dL    Hematocrit 39.4 34.0 - 48.0 %    MCV 88.3 80.0 - 99.9 fL    MCH 28.5 26.0 - 35.0 pg    MCHC 32.2 32.0 - 34.5 %    RDW 13.2 11.5 - 15.0 fL    Platelets 507 095 - 069 E9/L    MPV 9.9 7.0 - 12.0 fL    Neutrophils % 55.7 43.0 - 80.0 %    Immature Granulocytes % 0.4 0.0 - 5.0 %    Lymphocytes % 34.1 20.0 - 42.0 %    Monocytes % 7.3 2.0 - 12.0 %    Eosinophils % 1.8 0.0 - 6.0 %    Basophils % 0.7 0.0 - 2.0 %    Neutrophils Absolute 7.67 (H) 1.80 - 7.30 E9/L    Immature Granulocytes # 0.06 E9/L Lymphocytes Absolute 4.70 (H) 1.50 - 4.00 E9/L    Monocytes Absolute 1.00 (H) 0.10 - 0.95 E9/L    Eosinophils Absolute 0.25 0.05 - 0.50 E9/L    Basophils Absolute 0.09 0.00 - 0.20 E9/L   Troponin   Result Value Ref Range    Troponin, High Sensitivity 8 0 - 9 ng/L   Comprehensive Metabolic Panel w/ Reflex to MG   Result Value Ref Range    Sodium 135 132 - 146 mmol/L    Potassium reflex Magnesium 4.3 3.5 - 5.0 mmol/L    Chloride 99 98 - 107 mmol/L    CO2 23 22 - 29 mmol/L    Anion Gap 13 7 - 16 mmol/L    Glucose 310 (H) 74 - 99 mg/dL    BUN 14 6 - 23 mg/dL    CREATININE 0.5 0.5 - 1.0 mg/dL    GFR Non-African American >60 >=60 mL/min/1.73    GFR African American >60     Calcium 9.3 8.6 - 10.2 mg/dL    Total Protein 6.3 (L) 6.4 - 8.3 g/dL    Albumin 3.8 3.5 - 5.2 g/dL    Total Bilirubin <0.2 0.0 - 1.2 mg/dL    Alkaline Phosphatase 134 (H) 35 - 104 U/L    ALT 21 0 - 32 U/L    AST 16 0 - 31 U/L   EKG 12 Lead   Result Value Ref Range    Ventricular Rate 91 BPM    Atrial Rate 91 BPM    P-R Interval 158 ms    QRS Duration 66 ms    Q-T Interval 346 ms    QTc Calculation (Bazett) 425 ms    P Axis 23 degrees    R Axis 16 degrees    T Axis 30 degrees       Radiology:  XR CHEST (2 VW)   Final Result   Normal chest             ------------------------- NURSING NOTES AND VITALS REVIEWED ---------------------------  Date / Time Roomed:  3/7/2022  3:03 PM  ED Bed Assignment:  [id number]    The nursing notes within the ED encounter and vital signs as below have been reviewed.    BP (!) 158/74   Pulse 88   Temp 97.6 °F (36.4 °C) (Temporal)   Resp 15   Ht 5' 8\" (1.727 m)   Wt 210 lb (95.3 kg)   SpO2 96%   BMI 31.93 kg/m²   Oxygen Saturation Interpretation: Normal      ------------------------------------------ PROGRESS NOTES ------------------------------------------  2:46 PM EST  I have spoken with the patient and discussed todays results, in addition to providing specific details for the plan of care and counseling regarding the diagnosis and prognosis. Their questions are answered at this time and they are agreeable with the plan. I discussed at length with them reasons for immediate return here for re evaluation. They will followup with their primary care physician by calling their office tomorrow. --------------------------------- ADDITIONAL PROVIDER NOTES ---------------------------------  At this time the patient is without objective evidence of an acute process requiring hospitalization or inpatient management. They have remained hemodynamically stable throughout their entire ED visit and are stable for discharge with outpatient follow-up. The plan has been discussed in detail and they are aware of the specific conditions for emergent return, as well as the importance of follow-up. Discharge Medication List as of 3/7/2022  4:41 PM          Diagnosis:  1. Hypertension, unspecified type    2. Chest pain, unspecified type        Disposition:  Patient's disposition: Discharge to home  Patient's condition is stable. Patient was seen and evaluated by both myself and Ben Stearns DO.          Mariopsa Vieira DO  Resident  03/08/22 [id number] minimum assist (75% patients effort)

## 2024-07-26 NOTE — PHYSICAL THERAPY INITIAL EVALUATION ADULT - DISCHARGE PLANNER MADE AWARE
----- Message from Hanna Contreras RN sent at 7/26/2024 10:54 AM CDT -----  Regarding: update on patient, two requests please,  Amanda Chinchilla,    Patient was back in the ED yesterday for UTI, Sue states that her mother is taking the antibiotics, but they were told by someone that patient should be taking iron supplement, there has been no order written for this, also she needs to get into see you much sooner than September if possible, due to medication reconciliation, questions, etc. Could you please have your nurse get an appointment for patient for an afternoon, please order the iron if this is needed. Then place a call to Sue her daughter to inform of these updates, thank you so much.     Hanna Contreras, MSN, BSN, RN  Care Transitions Nurse  [facility]   [phone]      
Spoke to Patient daughter Sue and she was informed that based off Patient last CBC in 7-5-24 PCP recommended iron supplements daily for 6 months. Daughter verbalized understanding. Patient scheduled 8-5-24 with Dr. Strickland.   
yes